# Patient Record
Sex: FEMALE | Race: WHITE | NOT HISPANIC OR LATINO | Employment: OTHER | URBAN - METROPOLITAN AREA
[De-identification: names, ages, dates, MRNs, and addresses within clinical notes are randomized per-mention and may not be internally consistent; named-entity substitution may affect disease eponyms.]

---

## 2017-03-15 ENCOUNTER — TRANSCRIBE ORDERS (OUTPATIENT)
Dept: MAMMOGRAPHY | Facility: HOSPITAL | Age: 57
End: 2017-03-15

## 2017-03-15 DIAGNOSIS — Z12.31 VISIT FOR SCREENING MAMMOGRAM: Primary | ICD-10-CM

## 2017-06-27 ENCOUNTER — OFFICE VISIT (OUTPATIENT)
Dept: OBSTETRICS AND GYNECOLOGY | Facility: CLINIC | Age: 57
End: 2017-06-27

## 2017-06-27 VITALS
SYSTOLIC BLOOD PRESSURE: 128 MMHG | BODY MASS INDEX: 35.93 KG/M2 | DIASTOLIC BLOOD PRESSURE: 74 MMHG | WEIGHT: 183 LBS | HEIGHT: 60 IN

## 2017-06-27 DIAGNOSIS — N84.1 CERVICAL POLYP: Primary | ICD-10-CM

## 2017-06-27 PROCEDURE — 57500 BIOPSY OF CERVIX: CPT | Performed by: OBSTETRICS & GYNECOLOGY

## 2017-06-27 PROCEDURE — 99203 OFFICE O/P NEW LOW 30 MIN: CPT | Performed by: OBSTETRICS & GYNECOLOGY

## 2017-06-27 RX ORDER — VALACYCLOVIR HYDROCHLORIDE 500 MG/1
TABLET, FILM COATED ORAL
COMMUNITY
Start: 2017-06-19

## 2017-06-27 RX ORDER — LISINOPRIL 10 MG/1
10 TABLET ORAL DAILY
COMMUNITY

## 2017-06-27 RX ORDER — LORAZEPAM 0.5 MG/1
TABLET ORAL
COMMUNITY
Start: 2017-04-22 | End: 2017-06-27 | Stop reason: DRUGHIGH

## 2017-06-27 RX ORDER — CETIRIZINE HYDROCHLORIDE 10 MG/1
10 TABLET ORAL DAILY
COMMUNITY

## 2017-06-27 RX ORDER — VENLAFAXINE HYDROCHLORIDE 75 MG/1
CAPSULE, EXTENDED RELEASE ORAL
COMMUNITY
Start: 2017-06-11

## 2017-06-27 RX ORDER — LORAZEPAM 1 MG/1
TABLET ORAL
COMMUNITY
Start: 2017-06-19

## 2017-06-27 RX ORDER — CLONIDINE HYDROCHLORIDE 0.1 MG/1
0.1 TABLET ORAL 2 TIMES DAILY
COMMUNITY

## 2017-06-27 RX ORDER — PRAMIPEXOLE DIHYDROCHLORIDE 0.25 MG/1
TABLET ORAL
COMMUNITY
Start: 2017-05-19

## 2017-06-27 NOTE — PATIENT INSTRUCTIONS
Cervical Biopsy  A cervical biopsy is a procedure to remove a small sample of tissue from the cervix. The cervix is the lowest part of the womb (uterus), which opens into the vagina (birth canal).  You may have this procedure to check for cancer or growths that may become cancer. This procedure may also be done if the results of your Pap test were abnormal or if your health care provider saw an abnormality during a pelvic exam.  LET YOUR HEALTH CARE PROVIDER KNOW ABOUT:   · Any allergies you have.    · All medicines you are taking, including vitamins, herbs, eye drops, creams, and over-the-counter medicines.  · Previous problems you or members of your family have had with the use of anesthetics.    · Any blood disorders you have.  · Previous surgeries you have had.  · Any medical conditions you have.  · Whether you are pregnant or may be pregnant.  · Whether you are having your menstrual period or will be having your period at the time of the procedure.  RISKS AND COMPLICATIONS  Generally, this is a safe procedure. However, problems may occur, including:  · Infection.  · Bleeding.  · Allergic reactions to medicines or dyes.  · Damage to other structures or organs.  BEFORE THE PROCEDURE  · Do not douche, have sex, use tampons, or use any vaginal medicines before the procedure as told by your health care provider.  · Follow instructions from your health care provider about eating or drinking restrictions.  · Ask your health care provider about:    Changing or stopping your regular medicines. This is especially important if you are taking diabetes medicines or blood thinners.    Taking medicines such as aspirin and ibuprofen. These medicines can thin your blood. Do not take these medicines before your procedure if your health care provider instructs you not to.  · You may be given an over-the-counter pain medicine to take right before the procedure.  · You may be asked to empty your bladder and bowel right before the  procedure.  PROCEDURE   · You will undress from the waist down.  · You will lie on an examining table and put your feet in stirrups.  · To reduce your risk of infection:  ¨ Your health care team will wash or sanitize their hands.  ¨ Your skin will be washed with soap.  · Your health care provider will use a lubricated instrument (speculum) to open your vagina. An instrument that has a magnifying lens and a light (colposcope) will let your health care provider examine the cervix more closely.  · You may be given a medicine to numb the area (local anesthetic).  · Your health care provider will apply a solution to your cervix. This turns abnormal areas a pale color.  · Your health care provider will use an instrument (biopsy forceps) to take one or more small pieces of tissue that appear abnormal.  · If there seems to be an abnormal area in the part of your cervix that leads to the uterus (endocervical canal), your health care provider will use an instrument (curette) to scrape tissue from that area. This is called endocervical curettage.  · Your health care provider will apply a paste over the biopsy areas to help control bleeding.  The procedure may vary among health care providers and hospitals.  AFTER THE PROCEDURE  It is your responsibility to get the results of your procedure. Ask your health care provider or the department performing the procedure when your results will be ready.     This information is not intended to replace advice given to you by your health care provider. Make sure you discuss any questions you have with your health care provider.     Document Released: 12/18/2006 Document Revised: 09/07/2016 Document Reviewed: 05/04/2016  Elsevier Interactive Patient Education ©2017 [employer]

## 2017-06-27 NOTE — PROGRESS NOTES
"Subjective   Chief Complaint   Patient presents with   • CERVICAL POLYP     Patient referred by Anjali Willis, [facility] for cervical polyp seen on exam of pap smear.      Lyric Hopkins is a 56 y.o. year old  ( X #).  Patient's last menstrual period was 2015.  She presents to be seen because of polyp noted during pap (whichc was normal). MMg Last year WNL     OTHER COMPLAINTS:  Nothing else    The following portions of the patient's history were reviewed and updated as appropriate:current medications, allergies, past family history, past medical history, past social history and past surgical history    Smoking status: Never Smoker                                                              Smokeless status: Never Used                        Review of Systems  Consitutional POS: nothing reported    NEG: anorexia or night sweats   Gastointestinal POS: nothing reported    NEG: bloating, change in bowel habits, melena or reflux symptoms   Genitourinary POS: nothing reported    NEG: dysuria or hematuria   Integument POS: nothing reported    NEG: moles that are changing in size, shape, color or rashes   Breast POS: nothing reported    NEG: persistent breast lump, skin dimpling or nipple discharge         Pertinent items are noted in HPI.          Objective   /74  Ht 60\" (152.4 cm)  Wt 183 lb (83 kg)  LMP 2015  Breastfeeding? No  BMI 35.74 kg/m2    General:  well developed; well nourished  no acute distress   Skin:  No suspicious lesions seen   Thyroid: not examined   Lungs:  breathing is unlabored  clear to auscultation bilaterally   Heart:  Not performed.   Breasts:  Not performed.   Abdomen: soft, non-tender; no masses  no umbilical or inginual hernias are present  no hepato-splenomegaly   Pelvis: Clinical staff was present for exam  External genitalia:  normal appearance of the external genitalia including Bartholin's and [place]'s glands.  :  urethral meatus normal; urethral " hypermobility is absent.  Vaginal:  normal pink mucosa without prolapse or lesions.  Cervix:  normal appearance. small polyp @ os  Uterus:  normal size, shape and consistency.  polyp     Lab Review   No data reviewed    Imaging   No data reviewed        Assessment   1. Cervical Polyp     Plan   1. Polyps Removed w/o complication  2. Follow up          This note was electronically signed.      June 27, 2017

## 2017-07-06 DIAGNOSIS — N84.1 CERVICAL POLYP: ICD-10-CM

## 2018-09-21 ENCOUNTER — TRANSCRIBE ORDERS (OUTPATIENT)
Dept: ADMINISTRATIVE | Facility: HOSPITAL | Age: 58
End: 2018-09-21

## 2018-09-21 DIAGNOSIS — Z12.31 VISIT FOR SCREENING MAMMOGRAM: Primary | ICD-10-CM

## 2018-12-28 ENCOUNTER — OFFICE VISIT (OUTPATIENT)
Dept: ORTHOPEDIC SURGERY | Facility: CLINIC | Age: 58
End: 2018-12-28

## 2018-12-28 VITALS — HEIGHT: 60 IN | BODY MASS INDEX: 39.46 KG/M2 | WEIGHT: 201 LBS | RESPIRATION RATE: 18 BRPM

## 2018-12-28 DIAGNOSIS — S82.832A OTHER CLOSED FRACTURE OF PROXIMAL END OF LEFT FIBULA, INITIAL ENCOUNTER: Primary | ICD-10-CM

## 2018-12-28 PROCEDURE — 99204 OFFICE O/P NEW MOD 45 MIN: CPT | Performed by: ORTHOPAEDIC SURGERY

## 2018-12-28 RX ORDER — ALBUTEROL SULFATE 90 UG/1
AEROSOL, METERED RESPIRATORY (INHALATION)
COMMUNITY
Start: 2014-10-23

## 2018-12-28 RX ORDER — PHENYLEPHRINE HCL 10 MG/1
TABLET, FILM COATED ORAL
COMMUNITY
Start: 2018-10-01

## 2018-12-28 RX ORDER — IBUPROFEN 800 MG/1
TABLET ORAL
COMMUNITY

## 2018-12-28 NOTE — PROGRESS NOTES
Subjective   Patient ID: Lyric Hopkins is a 58 y.o. female  Fracture of the Left Leg (Patient reports 3/10 left leg pain that began after she fell from a step ladder while decorating her [employer] tree on December 22nd, 2018 . Patient denies head trauma or LOC. She was treated by [facility] on December, 26th 2018. )             History of Present Illness    Chief complaint lateral left knee pain no associated hip back pain numbness tingling or ankle pain.  Very slight swelling in the knee was placed in a knee immobilizer crutches has some pain with weightbearing denies medial knee pain or history of prior knee injuries.  Has history of multiple prior surgeries on spine and right shoulder.  Gives no history of prior DVT    Review of Systems   Constitutional: Negative for diaphoresis, fever and unexpected weight change.   HENT: Negative for dental problem and sore throat.    Eyes: Negative for visual disturbance.   Respiratory: Negative for shortness of breath.    Cardiovascular: Negative for chest pain.   Gastrointestinal: Negative for abdominal pain, constipation, diarrhea, nausea and vomiting.   Genitourinary: Negative for difficulty urinating and frequency.   Neurological: Negative for headaches.   Hematological: Does not bruise/bleed easily.   All other systems reviewed and are negative.      Past Medical History:   Diagnosis Date   • Anxiety    • Depression    • Genital HSV    • History of Papanicolaou smear of cervix 2017   • Hypertension         Past Surgical History:   Procedure Laterality Date   • BACK SURGERY  2008    ACDF, C4-C7   • BACK SURGERY  2013    L4-L7   • CARPAL TUNNEL RELEASE Bilateral    • CHOLECYSTECTOMY     • COLONOSCOPY  2012   • SHOULDER SURGERY  2013       Family History   Problem Relation Age of Onset   • Prostate cancer Father    • Lung cancer Mother    • Brain cancer Brother        Social History     Socioeconomic History   • Marital status:      Spouse name: Not on file   •  "Number of children: Not on file   • Years of education: Not on file   • Highest education level: Not on file   Social Needs   • Financial resource strain: Not on file   • Food insecurity - worry: Not on file   • Food insecurity - inability: Not on file   • Transportation needs - medical: Not on file   • Transportation needs - non-medical: Not on file   Occupational History   • Not on file   Tobacco Use   • Smoking status: Never Smoker   • Smokeless tobacco: Never Used   Substance and Sexual Activity   • Alcohol use: No   • Drug use: No   • Sexual activity: Yes     Partners: Male   Other Topics Concern   • Not on file   Social History Narrative   • Not on file       I have reviewed all of the above social hx, family hx, surgical hx, medications, allergies & ROS and confirm that it is accurate.    Allergies   Allergen Reactions   • Duloxetine Hcl Anaphylaxis         Current Outpatient Medications:   •  albuterol sulfate HFA (PROAIR HFA) 108 (90 Base) MCG/ACT inhaler, ProAir HFA 90 mcg/actuation aerosol inhaler  Every six hours, Disp: , Rfl:   •  cetirizine (zyrTEC) 10 MG tablet, Take 10 mg by mouth Daily., Disp: , Rfl:   •  CloNIDine (CATAPRES) 0.1 MG tablet, Take 0.1 mg by mouth 2 (Two) Times a Day., Disp: , Rfl:   •  diclofenac (VOLTAREN) 1 % gel gel, diclofenac 1 % topical gel  Apply 4 grams to affected area two times a day, Disp: , Rfl:   •  ibuprofen (ADVIL,MOTRIN) 800 MG tablet, ibuprofen 800 mg tablet  TAKE 1 TABLET BY MOUTH 3  TIMES A DAY AS NEEDED FOR  PAIN 90, Disp: , Rfl:   •  lisinopril (PRINIVIL,ZESTRIL) 10 MG tablet, Take 10 mg by mouth Daily., Disp: , Rfl:   •  LORazepam (ATIVAN) 1 MG tablet, , Disp: , Rfl:   •  phenylephrine (SUDAFED PE) 10 MG tablet, , Disp: , Rfl:   •  pramipexole (MIRAPEX) 0.25 MG tablet, , Disp: , Rfl:   •  valACYclovir (VALTREX) 500 MG tablet, , Disp: , Rfl:   •  venlafaxine XR (EFFEXOR-XR) 75 MG 24 hr capsule, , Disp: , Rfl:     Objective   Resp 18   Ht 152.4 cm (60\")   Wt 91.2 " kg (201 lb)   LMP 03/01/2015   BMI 39.26 kg/m²    Physical Exam  Constitutional: Patient is oriented to person, place, and time. Patient appears well-developed and well-nourished.   HENT:Head: Normocephalic and atraumatic.   Eyes: EOM are normal. Pupils are equal, round, and reactive to light.   Neck: Normal range of motion. Neck supple.   Cardiovascular: Normal rate.    Pulmonary/Chest: Effort normal and breath sounds normal.   Abdominal: Soft.   Neurological: Patient is alert and oriented to person, place, and time.   Skin: Skin is warm and dry.   Psychiatric: Patient has a normal mood and affect.   Nursing note and vitals reviewed.       [unfilled]   Left knee with slight ecchymosis and tenderness of the proximal fibular area, no tenderness on the medial tibial plateau or lateral tibial plateau, no significant effusion no warmth at the knee joint line, no pain or ecchymosis at the MCL origin or insertion, extensor mechanism intact, no significant edema in the calf very slight ecchymosis anterolateral lower leg consistent with a proximal fibular fracture, full ankle range of motion with no tenderness at the distal syndesmosis.  Sensorimotor function intact to left foot ankle    Assessment/Plan   Review of Radiographic Studies:    Radiographic images today of fracture I personally viewed and confirm satisfactory alignment.  No new imaging done today.      Procedures     Lyric was seen today for fracture.    Diagnoses and all orders for this visit:    Other closed fracture of proximal end of left fibula, initial encounter  -     Walker       Orthopedic activities reviewed and patient expressed appreciation and Use brace as instructed      Recommendations/Plan:   Work/Activity Status: May progress to weight-bear as tolerated on left lower leg and perform range of motion as tolerated in brace.    Patient agreeable to call or return sooner for any concerns.         Patient advised regarding brace wear May remove  brace for sleep when pain subsides, apply ice, low-dose aspirin recommended for DVT prophylaxis, follow-up x-ray in 6 weeks    Impression:  Minimally displaced left proximal fibular fracture neurovascularly intact with no sign of associated tibial plateau fracture or neurovascular compromise  Plan:  Follow-up in 6 weeks for x-ray left knee refer to therapy at that time

## 2019-01-14 ENCOUNTER — TRANSCRIBE ORDERS (OUTPATIENT)
Dept: ADMINISTRATIVE | Facility: HOSPITAL | Age: 59
End: 2019-01-14

## 2019-01-14 DIAGNOSIS — M25.562 LEFT KNEE PAIN, UNSPECIFIED CHRONICITY: Primary | ICD-10-CM

## 2019-01-17 ENCOUNTER — HOSPITAL ENCOUNTER (OUTPATIENT)
Dept: MRI IMAGING | Facility: HOSPITAL | Age: 59
End: 2019-01-17

## 2019-02-07 DIAGNOSIS — S82.832A OTHER CLOSED FRACTURE OF PROXIMAL END OF LEFT FIBULA, INITIAL ENCOUNTER: Primary | ICD-10-CM

## 2019-02-08 ENCOUNTER — OFFICE VISIT (OUTPATIENT)
Dept: ORTHOPEDIC SURGERY | Facility: CLINIC | Age: 59
End: 2019-02-08

## 2019-02-08 VITALS — WEIGHT: 201 LBS | HEIGHT: 60 IN | RESPIRATION RATE: 18 BRPM | BODY MASS INDEX: 39.46 KG/M2

## 2019-02-08 DIAGNOSIS — S82.832A OTHER CLOSED FRACTURE OF PROXIMAL END OF LEFT FIBULA, INITIAL ENCOUNTER: Primary | ICD-10-CM

## 2019-02-08 PROCEDURE — 99213 OFFICE O/P EST LOW 20 MIN: CPT | Performed by: PHYSICIAN ASSISTANT

## 2019-02-08 RX ORDER — IBUPROFEN 800 MG/1
TABLET ORAL
COMMUNITY

## 2019-02-08 RX ORDER — LISINOPRIL 20 MG/1
TABLET ORAL
COMMUNITY

## 2019-02-08 RX ORDER — ASPIRIN 81 MG/1
TABLET, COATED ORAL
COMMUNITY
Start: 2019-01-20

## 2019-02-08 RX ORDER — UBIDECARENONE 75 MG
CAPSULE ORAL
COMMUNITY
Start: 2019-01-20

## 2019-02-08 RX ORDER — CLONIDINE HYDROCHLORIDE 0.1 MG/1
TABLET ORAL
COMMUNITY

## 2019-02-08 RX ORDER — PRAMIPEXOLE DIHYDROCHLORIDE 0.25 MG/1
TABLET ORAL
COMMUNITY

## 2019-02-08 RX ORDER — OXYCODONE HYDROCHLORIDE AND ACETAMINOPHEN 5; 325 MG/1; MG/1
TABLET ORAL
COMMUNITY
Start: 2019-01-01

## 2019-02-08 RX ORDER — LIDOCAINE, CAPSAICIN, MENTHOL, METHYL SALICYLATE .025; 4; 5; 2 G/1; G/1; G/1; G/1
PATCH TOPICAL
COMMUNITY
Start: 2019-01-20

## 2019-02-08 RX ORDER — LISINOPRIL 20 MG/1
TABLET ORAL
COMMUNITY
Start: 2019-02-05

## 2019-02-08 NOTE — PROGRESS NOTES
Subjective   Patient ID: Lyric Hopkins is a 58 y.o.female  Follow-up of the Left Knee (Patient is here today and states her knee feels worse with the brace on. She says the knee is feeling much better. Her pain level is 0/10.)         History of Present Illness  Patient is following up at his scheduled appointment in regards to left knee proximal fibula fracture after she fell December 26, 2018.  Patient has been using a hinged knee brace.  She states she has walked at home without the brace and has had no pain.  Patient states she has had to cancel her ski trip due to her injury.                                                 Past Medical History:   Diagnosis Date   • Anxiety    • Depression    • Genital HSV    • History of Papanicolaou smear of cervix 2017   • Hypertension         Past Surgical History:   Procedure Laterality Date   • BACK SURGERY  2008    ACDF, C4-C7   • BACK SURGERY  2013    L4-L7   • CARPAL TUNNEL RELEASE Bilateral    • CHOLECYSTECTOMY     • COLONOSCOPY  2012   • SHOULDER SURGERY  2013       Family History   Problem Relation Age of Onset   • Prostate cancer Father    • Lung cancer Mother    • Brain cancer Brother        Social History     Socioeconomic History   • Marital status:      Spouse name: Not on file   • Number of children: Not on file   • Years of education: Not on file   • Highest education level: Not on file   Social Needs   • Financial resource strain: Not on file   • Food insecurity - worry: Not on file   • Food insecurity - inability: Not on file   • Transportation needs - medical: Not on file   • Transportation needs - non-medical: Not on file   Occupational History   • Not on file   Tobacco Use   • Smoking status: Never Smoker   • Smokeless tobacco: Never Used   Substance and Sexual Activity   • Alcohol use: No   • Drug use: No   • Sexual activity: Yes     Partners: Male   Other Topics Concern   • Not on file   Social History Narrative   • Not on file         Current  Outpatient Medications:   •  1ST MEDX-PATCH/ LIDOCAINE 4-0.025-5-20 % patch, , Disp: , Rfl:   •  albuterol sulfate HFA (PROAIR HFA) 108 (90 Base) MCG/ACT inhaler, ProAir HFA 90 mcg/actuation aerosol inhaler  Every six hours, Disp: , Rfl:   •  ASPIRIN LOW DOSE 81 MG EC tablet, , Disp: , Rfl:   •  cetirizine (zyrTEC) 10 MG tablet, Take 10 mg by mouth Daily., Disp: , Rfl:   •  CloNIDine (CATAPRES) 0.1 MG tablet, Take 0.1 mg by mouth 2 (Two) Times a Day., Disp: , Rfl:   •  CloNIDine (CATAPRES) 0.1 MG tablet, clonidine HCl 0.1 mg tablet  TAKE 1 TABLET EVERY DAY, Disp: , Rfl:   •  diclofenac (VOLTAREN) 1 % gel gel, diclofenac 1 % topical gel  Apply 4 grams to affected area two times a day, Disp: , Rfl:   •  EFFERVESCENT PAIN RELIEF [id number] MG effervescent tablet, , Disp: , Rfl:   •  ibuprofen (ADVIL,MOTRIN) 800 MG tablet, ibuprofen 800 mg tablet  TAKE 1 TABLET BY MOUTH 3  TIMES A DAY AS NEEDED FOR  PAIN 90, Disp: , Rfl:   •  ibuprofen (ADVIL,MOTRIN) 800 MG tablet, ibuprofen 800 mg tablet  TAKE 1 TABLET THREE TIMES DAILY AS NEEDED FOR PAIN, Disp: , Rfl:   •  lisinopril (PRINIVIL,ZESTRIL) 10 MG tablet, Take 10 mg by mouth Daily., Disp: , Rfl:   •  lisinopril (PRINIVIL,ZESTRIL) 20 MG tablet, lisinopril 20 mg tablet  TAKE 1 TABLET EVERY DAY, Disp: , Rfl:   •  lisinopril (PRINIVIL,ZESTRIL) 20 MG tablet, , Disp: , Rfl:   •  LORazepam (ATIVAN) 1 MG tablet, , Disp: , Rfl:   •  oxyCODONE-acetaminophen (PERCOCET) 5-325 MG per tablet, , Disp: , Rfl:   •  phenylephrine (SUDAFED PE) 10 MG tablet, , Disp: , Rfl:   •  pramipexole (MIRAPEX) 0.25 MG tablet, , Disp: , Rfl:   •  pramipexole (MIRAPEX) 0.25 MG tablet, pramipexole 0.25 mg tablet  TAKE 1 TO 3 TABLETS  AT BEDTIME, Disp: , Rfl:   •  valACYclovir (VALTREX) 500 MG tablet, , Disp: , Rfl:   •  venlafaxine XR (EFFEXOR-XR) 75 MG 24 hr capsule, , Disp: , Rfl:   •  vitamin B-12 (CYANOCOBALAMIN) 100 MCG tablet, , Disp: , Rfl:     Allergies   Allergen Reactions   • Duloxetine Hcl  "Anaphylaxis       Review of Systems   Constitutional: Negative for fever.   HENT: Negative for voice change.    Eyes: Negative for visual disturbance.   Respiratory: Negative for shortness of breath.    Cardiovascular: Negative for chest pain.   Gastrointestinal: Negative for abdominal distention and abdominal pain.   Genitourinary: Negative for dysuria.   Musculoskeletal: Negative for arthralgias, gait problem and joint swelling.   Skin: Negative for rash.   Neurological: Negative for speech difficulty.   Hematological: Does not bruise/bleed easily.   Psychiatric/Behavioral: Negative for confusion.       Objective   Resp 18   Ht 152.4 cm (60\")   Wt 91.2 kg (201 lb)   LMP 03/01/2015   BMI 39.26 kg/m²    Physical Exam   Constitutional: She is oriented to person, place, and time. She appears well-nourished.   Pulmonary/Chest: Effort normal.   Musculoskeletal:        Left knee: She exhibits no swelling, no effusion and no ecchymosis. No tenderness found. No medial joint line and no lateral joint line tenderness noted.        Left ankle: She exhibits no swelling. No tenderness. No lateral malleolus and no medial malleolus tenderness found.   Neurological: She is alert and oriented to person, place, and time.   Skin: Capillary refill takes less than 2 seconds.   Psychiatric: She has a normal mood and affect.   Vitals reviewed.    Left Knee Exam     Other   Effusion: no effusion present           Extremity DVT signs are Negative on physical exam with negative Keira sign, with no calf pain, with no palpable cords, with no increased pain with passive stretch/extension and with no skin tone change   Neurologic Exam     Mental Status   Oriented to person, place, and time.        There is no tenderness to the distal left ankle syndesmosis        Assessment/Plan   Independent Review of Radiographic Studies:    Indication to evaluate fracture healing, and compared with prior imaging, shows interm fracture healing, callus " formation and or periostitis in continued good position and alignment.      Procedures       Lyric was seen today for follow-up.    Diagnoses and all orders for this visit:    Other closed fracture of proximal end of left fibula, initial encounter  -     Ambulatory Referral to Physical Therapy Evaluate and treat       Discussion of orthopedic goals  Risk, benefits, and merits of treatment alternatives reviewed with the patient and questions answered  Physical therapy referral given  Ice, heat, and/or modalities as beneficial    Recommendations/Plan:  Exercise, medications, injections, other patient advice, and return appointment as noted.  Patient is encouraged to call or return for any issues or concerns.    I do not feel the patient should participate in skiing until at least the end of March 2019.  No longer requires the use the brace but do recommend formal physical therapy to strengthen the left knee  Patient agreeable to call or return sooner for any concerns.  FU in 6 weeks

## 2019-03-01 ENCOUNTER — APPOINTMENT (OUTPATIENT)
Dept: MAMMOGRAPHY | Facility: HOSPITAL | Age: 59
End: 2019-03-01

## 2019-03-05 ENCOUNTER — APPOINTMENT (OUTPATIENT)
Dept: MAMMOGRAPHY | Facility: HOSPITAL | Age: 59
End: 2019-03-05

## 2019-09-26 ENCOUNTER — TRANSCRIBE ORDERS (OUTPATIENT)
Dept: ADMINISTRATIVE | Facility: HOSPITAL | Age: 59
End: 2019-09-26

## 2019-09-26 DIAGNOSIS — Z12.31 VISIT FOR SCREENING MAMMOGRAM: Primary | ICD-10-CM

## 2020-01-02 ENCOUNTER — OFFICE VISIT (OUTPATIENT)
Dept: ORTHOPEDIC SURGERY | Facility: CLINIC | Age: 60
End: 2020-01-02

## 2020-01-02 VITALS — WEIGHT: 201 LBS | BODY MASS INDEX: 39.46 KG/M2 | RESPIRATION RATE: 18 BRPM | HEIGHT: 60 IN

## 2020-01-02 DIAGNOSIS — M77.8 LEFT WRIST TENDONITIS: Primary | ICD-10-CM

## 2020-01-02 PROCEDURE — 20550 NJX 1 TENDON SHEATH/LIGAMENT: CPT | Performed by: ORTHOPAEDIC SURGERY

## 2020-01-02 PROCEDURE — 99214 OFFICE O/P EST MOD 30 MIN: CPT | Performed by: ORTHOPAEDIC SURGERY

## 2020-01-02 RX ORDER — LIDOCAINE HYDROCHLORIDE 10 MG/ML
5 INJECTION, SOLUTION INFILTRATION; PERINEURAL
Status: COMPLETED | OUTPATIENT
Start: 2020-01-02 | End: 2020-01-02

## 2020-01-02 RX ADMIN — LIDOCAINE HYDROCHLORIDE 5 MG: 10 INJECTION, SOLUTION INFILTRATION; PERINEURAL at 13:31

## 2020-01-02 NOTE — PROGRESS NOTES
Subjective   Patient ID: Lyric Hopkins is a 59 y.o. female  Pain of the Left Wrist (Patient is here today for left wrist pain, she states her wrist is swelling and hurts really bad, she did have x-rays @ [facility]. She states this started about a year ago but the last flare up started Monday.)             History of Present Illness  Right-hand-dominant 59-year-old with left ulnar-sided wrist pain started just this past week no traumatic onset had x-rays done at [facility] which were unremarkable, pain localized to the ulnar side dorsally at the wrist no erythema no wounds pain is present with ulnar deviation dorsiflexion.  No paresthesias pain does at times radiate up to the elbow even up as far as the shoulder at times.  Denies neck pain or other paresthesias.  The wrist is stiff when she tries to move it.      Review of Systems   Constitutional: Negative for fever.   HENT: Negative for dental problem and voice change.    Eyes: Negative for visual disturbance.   Respiratory: Negative for shortness of breath.    Cardiovascular: Negative for chest pain.   Gastrointestinal: Negative for abdominal pain.   Genitourinary: Negative for dysuria.   Musculoskeletal: Positive for arthralgias. Negative for gait problem and joint swelling.   Skin: Negative for rash.   Neurological: Negative for speech difficulty.   Hematological: Does not bruise/bleed easily.   Psychiatric/Behavioral: Negative for confusion.       Past Medical History:   Diagnosis Date   • Anxiety    • Depression    • Genital HSV    • History of Papanicolaou smear of cervix 2017   • Hypertension         Past Surgical History:   Procedure Laterality Date   • BACK SURGERY  2008    ACDF, C4-C7   • BACK SURGERY  2013    L4-L7   • CARPAL TUNNEL RELEASE Bilateral    • CHOLECYSTECTOMY     • COLONOSCOPY  2012   • SHOULDER SURGERY  2013       Family History   Problem Relation Age of Onset   • Prostate cancer Father    • Lung cancer Mother    • Brain cancer  Brother        Social History     Socioeconomic History   • Marital status:      Spouse name: Not on file   • Number of children: Not on file   • Years of education: Not on file   • Highest education level: Not on file   Tobacco Use   • Smoking status: Never Smoker   • Smokeless tobacco: Never Used   Substance and Sexual Activity   • Alcohol use: No   • Drug use: No   • Sexual activity: Yes     Partners: Male       I have reviewed the above medical and surgical history, family history, social history, medications, allergies and review of systems.    Allergies   Allergen Reactions   • Duloxetine Hcl Anaphylaxis         Current Outpatient Medications:   •  1ST MEDX-PATCH/ LIDOCAINE 4-0.025-5-20 % patch, , Disp: , Rfl:   •  albuterol sulfate HFA (PROAIR HFA) 108 (90 Base) MCG/ACT inhaler, ProAir HFA 90 mcg/actuation aerosol inhaler  Every six hours, Disp: , Rfl:   •  ASPIRIN LOW DOSE 81 MG EC tablet, , Disp: , Rfl:   •  cetirizine (zyrTEC) 10 MG tablet, Take 10 mg by mouth Daily., Disp: , Rfl:   •  CloNIDine (CATAPRES) 0.1 MG tablet, Take 0.1 mg by mouth 2 (Two) Times a Day., Disp: , Rfl:   •  CloNIDine (CATAPRES) 0.1 MG tablet, clonidine HCl 0.1 mg tablet  TAKE 1 TABLET EVERY DAY, Disp: , Rfl:   •  diclofenac (VOLTAREN) 1 % gel gel, diclofenac 1 % topical gel  Apply 4 grams to affected area two times a day, Disp: , Rfl:   •  EFFERVESCENT PAIN RELIEF [id number] MG effervescent tablet, , Disp: , Rfl:   •  ibuprofen (ADVIL,MOTRIN) 800 MG tablet, ibuprofen 800 mg tablet  TAKE 1 TABLET BY MOUTH 3  TIMES A DAY AS NEEDED FOR  PAIN 90, Disp: , Rfl:   •  ibuprofen (ADVIL,MOTRIN) 800 MG tablet, ibuprofen 800 mg tablet  TAKE 1 TABLET THREE TIMES DAILY AS NEEDED FOR PAIN, Disp: , Rfl:   •  lisinopril (PRINIVIL,ZESTRIL) 10 MG tablet, Take 10 mg by mouth Daily., Disp: , Rfl:   •  lisinopril (PRINIVIL,ZESTRIL) 20 MG tablet, lisinopril 20 mg tablet  TAKE 1 TABLET EVERY DAY, Disp: , Rfl:   •  lisinopril (PRINIVIL,ZESTRIL) 20  "MG tablet, , Disp: , Rfl:   •  LORazepam (ATIVAN) 1 MG tablet, , Disp: , Rfl:   •  oxyCODONE-acetaminophen (PERCOCET) 5-325 MG per tablet, , Disp: , Rfl:   •  phenylephrine (SUDAFED PE) 10 MG tablet, , Disp: , Rfl:   •  pramipexole (MIRAPEX) 0.25 MG tablet, , Disp: , Rfl:   •  pramipexole (MIRAPEX) 0.25 MG tablet, pramipexole 0.25 mg tablet  TAKE 1 TO 3 TABLETS  AT BEDTIME, Disp: , Rfl:   •  valACYclovir (VALTREX) 500 MG tablet, , Disp: , Rfl:   •  venlafaxine XR (EFFEXOR-XR) 75 MG 24 hr capsule, , Disp: , Rfl:   •  vitamin B-12 (CYANOCOBALAMIN) 100 MCG tablet, , Disp: , Rfl:     Objective   Resp 18   Ht 152.4 cm (60\")   Wt 91.2 kg (201 lb)   LMP 03/01/2015   BMI 39.26 kg/m²    Physical Exam  Constitutional: Patient is oriented to person, place, and time. Patient appears well-developed and well-nourished.   HENT:Head: Normocephalic and atraumatic.   Eyes: EOM are normal. Pupils are equal, round, and reactive to light.   Neck: Normal range of motion. Neck supple.   Cardiovascular: Normal rate.    Pulmonary/Chest: Effort normal and breath sounds normal.   Abdominal: Soft.   Neurological: Patient is alert and oriented to person, place, and time.   Skin: Skin is warm and dry.   Psychiatric: Patient has a normal mood and affect.   Nursing note and vitals reviewed.       [unfilled]   Left wrist: Point tenderness over the extensor carpi ulnaris tendon sheath with slight swelling noted along the course, pain is present with ulnar deviation dorsiflexion along the ulnar side of the TFC.  No tenderness at the midcarpal or anatomic snuffbox area no radial sided proximal carpal tenderness, dorsiflexion and ulnar deviation do elicit pain on the ulnar side of the proximal carpal area.  Elbow with full range of motion Spurling maneuver negative for arm pain or paresthesias    Assessment/Plan   Review of Radiographic Studies:    No new imaging done today.  Outside radiographs from the [facility] confirm slight radial " "minus variant no other sign of acute arthritic change fracture or soft tissue swelling      Injection Tendon or Ligament-LT ECU tendon sheath  Date/Time: 1/2/2020 1:31 PM  Performed by: Almas Gamez MD  Authorized by: Almas Gamez MD   Consent: Verbal consent obtained.  Risks and benefits: risks, benefits and alternatives were discussed  Consent given by: patient  Patient understanding: patient states understanding of the procedure being performed  Patient consent: the patient's understanding of the procedure matches consent given  Procedure consent: procedure consent matches procedure scheduled  Relevant documents: relevant documents present and verified  Test results: test results available and properly labeled  Site marked: the operative site was marked  Imaging studies: imaging studies available  Patient identity confirmed: verbally with patient  Time out: Immediately prior to procedure a \"time out\" was called to verify the correct patient, procedure, equipment, support staff and site/side marked as required.  Preparation: Patient was prepped and draped in the usual sterile fashion.  Local anesthesia used: no    Anesthesia:  Local anesthesia used: no  Patient tolerance: Patient tolerated the procedure well with no immediate complications  Comments: 0.5cc triamcinolone 40mg per 1mL [id number] exp-09/01/2021  Medications administered: 5 mg lidocaine 1 %           Lyric was seen today for pain.    Diagnoses and all orders for this visit:    Left wrist tendonitis       Orthopedic activities reviewed and patient expressed appreciation and Using illustrations and models, the nature of the pathology was explained to the patient      Recommendations/Plan:   Work/Activity Status: May perform usual activities as tolerated    Patient agreeable to call or return sooner for any concerns.         Refer to occupational  therapy for local modalities    Impression:  Left nondominant wrist ulnar-sided " pain probable ECU tendinitis possible TFC tear  Plan:  If pain continues after her referral to therapy MR left wrist return to see me after MR complete